# Patient Record
Sex: MALE | Race: WHITE | Employment: FULL TIME | URBAN - METROPOLITAN AREA
[De-identification: names, ages, dates, MRNs, and addresses within clinical notes are randomized per-mention and may not be internally consistent; named-entity substitution may affect disease eponyms.]

---

## 2021-05-25 ENCOUNTER — APPOINTMENT (OUTPATIENT)
Dept: CT IMAGING | Age: 56
End: 2021-05-25
Payer: COMMERCIAL

## 2021-05-25 ENCOUNTER — HOSPITAL ENCOUNTER (EMERGENCY)
Age: 56
Discharge: HOME | End: 2021-05-25
Attending: EMERGENCY MEDICINE
Payer: COMMERCIAL

## 2021-05-25 VITALS
WEIGHT: 235 LBS | HEIGHT: 74 IN | TEMPERATURE: 97.8 F | RESPIRATION RATE: 16 BRPM | HEART RATE: 52 BPM | SYSTOLIC BLOOD PRESSURE: 144 MMHG | OXYGEN SATURATION: 96 % | DIASTOLIC BLOOD PRESSURE: 92 MMHG | BODY MASS INDEX: 30.16 KG/M2

## 2021-05-25 DIAGNOSIS — N20.0 KIDNEY STONE: Primary | ICD-10-CM

## 2021-05-25 LAB
ANION GAP SERPL CALCULATED.3IONS-SCNC: 10 MMOL/L (ref 3–16)
BACTERIA: ABNORMAL /HPF
BILIRUBIN URINE: ABNORMAL
BLOOD, URINE: ABNORMAL
BUN BLDV-MCNC: 10 MG/DL (ref 7–20)
CALCIUM SERPL-MCNC: 9.6 MG/DL (ref 8.3–10.6)
CHLORIDE BLD-SCNC: 103 MMOL/L (ref 99–110)
CLARITY: ABNORMAL
CO2: 28 MMOL/L (ref 21–32)
COLOR: ABNORMAL
CREAT SERPL-MCNC: 1.2 MG/DL (ref 0.9–1.3)
EPITHELIAL CELLS, UA: ABNORMAL /HPF (ref 0–5)
GFR AFRICAN AMERICAN: >60
GFR NON-AFRICAN AMERICAN: >60
GLUCOSE BLD-MCNC: 136 MG/DL (ref 70–99)
GLUCOSE URINE: NEGATIVE MG/DL
KETONES, URINE: 15 MG/DL
LEUKOCYTE ESTERASE, URINE: NEGATIVE
MICROSCOPIC EXAMINATION: YES
MUCUS: ABNORMAL /LPF
NITRITE, URINE: POSITIVE
PH UA: 6.5 (ref 5–8)
POTASSIUM REFLEX MAGNESIUM: 4.1 MMOL/L (ref 3.5–5.1)
PROTEIN UA: 100 MG/DL
RBC UA: ABNORMAL /HPF (ref 0–4)
SODIUM BLD-SCNC: 141 MMOL/L (ref 136–145)
SPECIFIC GRAVITY UA: >=1.03 (ref 1–1.03)
URINE TYPE: ABNORMAL
UROBILINOGEN, URINE: 1 E.U./DL
WBC UA: ABNORMAL /HPF (ref 0–5)

## 2021-05-25 PROCEDURE — 96375 TX/PRO/DX INJ NEW DRUG ADDON: CPT

## 2021-05-25 PROCEDURE — 6360000002 HC RX W HCPCS: Performed by: EMERGENCY MEDICINE

## 2021-05-25 PROCEDURE — 80048 BASIC METABOLIC PNL TOTAL CA: CPT

## 2021-05-25 PROCEDURE — 2580000003 HC RX 258: Performed by: EMERGENCY MEDICINE

## 2021-05-25 PROCEDURE — 81001 URINALYSIS AUTO W/SCOPE: CPT

## 2021-05-25 PROCEDURE — 96374 THER/PROPH/DIAG INJ IV PUSH: CPT

## 2021-05-25 PROCEDURE — 99284 EMERGENCY DEPT VISIT MOD MDM: CPT

## 2021-05-25 PROCEDURE — 74176 CT ABD & PELVIS W/O CONTRAST: CPT

## 2021-05-25 PROCEDURE — 6370000000 HC RX 637 (ALT 250 FOR IP): Performed by: EMERGENCY MEDICINE

## 2021-05-25 RX ORDER — KETOROLAC TROMETHAMINE 30 MG/ML
15 INJECTION, SOLUTION INTRAMUSCULAR; INTRAVENOUS ONCE
Status: COMPLETED | OUTPATIENT
Start: 2021-05-25 | End: 2021-05-25

## 2021-05-25 RX ORDER — OXYCODONE HYDROCHLORIDE AND ACETAMINOPHEN 5; 325 MG/1; MG/1
1 TABLET ORAL ONCE
Status: COMPLETED | OUTPATIENT
Start: 2021-05-25 | End: 2021-05-25

## 2021-05-25 RX ORDER — ONDANSETRON 2 MG/ML
4 INJECTION INTRAMUSCULAR; INTRAVENOUS ONCE
Status: COMPLETED | OUTPATIENT
Start: 2021-05-25 | End: 2021-05-25

## 2021-05-25 RX ORDER — OXYCODONE HYDROCHLORIDE AND ACETAMINOPHEN 5; 325 MG/1; MG/1
1 TABLET ORAL EVERY 6 HOURS PRN
Qty: 12 TABLET | Refills: 0 | Status: SHIPPED | OUTPATIENT
Start: 2021-05-25 | End: 2021-05-28

## 2021-05-25 RX ORDER — CEPHALEXIN 250 MG/1
500 CAPSULE ORAL ONCE
Status: COMPLETED | OUTPATIENT
Start: 2021-05-25 | End: 2021-05-25

## 2021-05-25 RX ORDER — CEPHALEXIN 500 MG/1
500 CAPSULE ORAL 4 TIMES DAILY
Qty: 28 CAPSULE | Refills: 0 | Status: SHIPPED | OUTPATIENT
Start: 2021-05-25 | End: 2021-06-01

## 2021-05-25 RX ORDER — ONDANSETRON 4 MG/1
4 TABLET, ORALLY DISINTEGRATING ORAL 3 TIMES DAILY PRN
Qty: 21 TABLET | Refills: 0 | Status: SHIPPED | OUTPATIENT
Start: 2021-05-25

## 2021-05-25 RX ORDER — 0.9 % SODIUM CHLORIDE 0.9 %
1000 INTRAVENOUS SOLUTION INTRAVENOUS ONCE
Status: COMPLETED | OUTPATIENT
Start: 2021-05-25 | End: 2021-05-25

## 2021-05-25 RX ORDER — TAMSULOSIN HYDROCHLORIDE 0.4 MG/1
0.4 CAPSULE ORAL DAILY
Qty: 7 CAPSULE | Refills: 0 | Status: SHIPPED | OUTPATIENT
Start: 2021-05-25 | End: 2021-06-01

## 2021-05-25 RX ORDER — MORPHINE SULFATE 4 MG/ML
4 INJECTION, SOLUTION INTRAMUSCULAR; INTRAVENOUS ONCE
Status: COMPLETED | OUTPATIENT
Start: 2021-05-25 | End: 2021-05-25

## 2021-05-25 RX ADMIN — OXYCODONE HYDROCHLORIDE AND ACETAMINOPHEN 1 TABLET: 5; 325 TABLET ORAL at 12:10

## 2021-05-25 RX ADMIN — SODIUM CHLORIDE 1000 ML: 9 INJECTION, SOLUTION INTRAVENOUS at 07:00

## 2021-05-25 RX ADMIN — ONDANSETRON 4 MG: 2 INJECTION INTRAMUSCULAR; INTRAVENOUS at 07:00

## 2021-05-25 RX ADMIN — KETOROLAC TROMETHAMINE 15 MG: 30 INJECTION, SOLUTION INTRAMUSCULAR at 07:00

## 2021-05-25 RX ADMIN — OXYCODONE HYDROCHLORIDE AND ACETAMINOPHEN 1 TABLET: 5; 325 TABLET ORAL at 09:51

## 2021-05-25 RX ADMIN — CEPHALEXIN 500 MG: 250 CAPSULE ORAL at 12:10

## 2021-05-25 RX ADMIN — MORPHINE SULFATE 4 MG: 4 INJECTION, SOLUTION INTRAMUSCULAR; INTRAVENOUS at 08:46

## 2021-05-25 ASSESSMENT — PAIN SCALES - GENERAL: PAINLEVEL_OUTOF10: 5

## 2021-05-25 NOTE — ED NOTES
Verbal and written discharge instructions given. IV removed. Patient provided with strainer. Prescriptions given to patient. Patient in stable condition, discharged home with wife.       Lorena Diaz RN  05/25/21 1218

## 2021-05-25 NOTE — ED NOTES
a hospitalists added to treatment team at 10:57.   RE:ureterolithiasis     Amanda Ramirez  05/25/21 105

## 2021-05-25 NOTE — ED PROVIDER NOTES
[address] [facility]  ED  EMERGENCY DEPARTMENT ENCOUNTER      Pt Name: Shanta Lopez  MRN: [id number]  Gaudenciogfmichael 1965  Date of evaluation: 5/25/2021  Provider: Darryle Setters, MD    CHIEF COMPLAINT       Chief Complaint   Patient presents with    Flank Pain     right side flank pain started at 0400. denies any hx of kidney stones. states the pain has made him vomit.  Emesis         HISTORY OF PRESENT ILLNESS   (Location/Symptom, Timing/Onset, Context/Setting, Quality, Duration, Modifying Factors, Severity)  Note limiting factors. Shanta Lopez is a 54 y.o. male with past medical history of significant illness here today for flank pain. Patient states that approximately 4 AM he had the acute onset of sharp stabbing pain in the right flank coming going in waves. Moderate to severe. Associated with multiple episodes of emesis. No dysuria or hematuria. Pain is nonradiating. No obvious alleviating factors. No fevers or chills. No chest pain, cough or shortness of breath. He is concerned he may have a kidney stone though notes he is never had one in the past.    [facility]    Nursing Notes were reviewed. REVIEW OF SYSTEMS    (2-9 systems for level 4, 10 or more for level 5)     Review of Systems    Please see HPI for pertinent positive and negative review of system findings. A full 10 system ROS was performed and otherwise negative. PAST MEDICAL HISTORY     Past Medical History:   Diagnosis Date    COVID-19          SURGICAL HISTORY       Past Surgical History:   Procedure Laterality Date    EYE SURGERY Bilateral     Cataract, detached retina         CURRENT MEDICATIONS       Previous Medications    ASPIRIN 81 MG TABLET    Take 81 mg by mouth daily. CHOLECALCIFEROL (VITAMIN D3) 2000 UNITS CAPS    Take  by mouth daily. MULTIPLE VITAMINS-MINERALS (THERAPEUTIC MULTIVITAMIN-MINERALS) TABLET    Take 1 tablet by mouth daily    OMEGA-3 FATTY ACIDS (FISH OIL PO)    Take  by mouth daily. ALLERGIES     Patient has no known allergies. FAMILY HISTORY       Family History   Problem Relation Age of Onset    High Blood Pressure Mother     Other Father         hole in heart          SOCIAL HISTORY       Social History     Socioeconomic History    Marital status:      Spouse name: None    Number of children: None    Years of education: None    Highest education level: None   Occupational History    None   Tobacco Use    Smoking status: Never Smoker    Smokeless tobacco: Never Used   Substance and Sexual Activity    Alcohol use: Yes     Alcohol/week: 5.0 standard drinks     Types: 2 Glasses of wine, 3 Cans of beer per week    Drug use: No    Sexual activity: Yes     Partners: Female   Other Topics Concern    None   Social History Narrative    None     Social Determinants of Health     Financial Resource Strain:     Difficulty of Paying Living Expenses:    Food Insecurity:     Worried About Running Out of Food in the Last Year:     Ran Out of Food in the Last Year:    Transportation Needs:     Lack of Transportation (Medical):      Lack of Transportation (Non-Medical):    Physical Activity:     Days of Exercise per Week:     Minutes of Exercise per Session:    Stress:     Feeling of Stress :    Social Connections:     Frequency of Communication with Friends and Family:     Frequency of Social Gatherings with Friends and Family:     Attends [employer] Services:     Active Member of Clubs or Organizations:     Attends Club or Organization Meetings:     Marital Status:    Intimate Partner Violence:     Fear of Current or Ex-Partner:     Emotionally Abused:     Physically Abused:     Sexually Abused:        SCREENINGS    [place] Coma Scale  Eye Opening: Spontaneous  Best Verbal Response: Oriented  Best Motor Response: Obeys commands  Jennifer Coma Scale Score: 15          PHYSICAL EXAM    (up to 7 for level 4, 8 or more for level 5)     ED Triage Vitals [05/25/21 0620] BP Temp Temp Source Pulse Resp SpO2 Height Weight   (!) 148/91 97.8 °F (36.6 °C) Oral 51 17 98 % 6' 2\" (1.88 m) 235 lb (106.6 kg)       Physical Exam    General appearance:  Cooperative. No acute distress. Skin:  Warm. Dry. Eye:  Extraocular movements intact. Ears, nose, mouth and throat:  Oral mucosa moist,  Neck:  Trachea midline. Heart:  Regular rate and rhythm  Perfusion:  intact  Respiratory:  Lungs clear to auscultation bilaterally. Respirations nonlabored. Abdominal:   Non distended. Mild right CVA tenderness but overall soft abdomen  Neurological:  Alert and oriented x 3.   Moves all extremities spontaneously  Musculoskeletal:   Normal ROM, no deformities          Psychiatric:  Normal mood      DIAGNOSTIC RESULTS       Labs Reviewed   URINALYSIS - Abnormal; Notable for the following components:       Result Value    Clarity, UA SL CLOUDY (*)     Bilirubin Urine MODERATE (*)     Ketones, Urine 15 (*)     Blood, Urine LARGE (*)     Protein,  (*)     Nitrite, Urine POSITIVE (*)     All other components within normal limits    Narrative:     Performed at:  [address],  [place]   Phone [phone]   BASIC METABOLIC PANEL W/ REFLEX TO MG FOR LOW K - Abnormal; Notable for the following components:    Glucose 136 (*)     All other components within normal limits    Narrative:     Performed at:  [address],  [place], [place]   Phone [phone]   MICROSCOPIC URINALYSIS - Abnormal; Notable for the following components:    Mucus, UA 1+ (*)     WBC, UA 6-9 (*)     RBC, UA 21-50 (*)     Bacteria, UA Rare (*)     All other components within normal limits    Narrative:     Performed at:  [address],  [place], [place]   Phone [phone]       Interpretation per the Radiologist below, if obtained/available at the time of this note:    CT ABDOMEN PELVIS WO CONTRAST Additional Contrast? None   Final Result   1. Mildly obstructing 4 mm proximal right ureteral calculus. 2. Nonobstructing right nephrolithiasis. All other labs/imaging were within normal range or not returned as of this dictation. EMERGENCY DEPARTMENT COURSE and DIFFERENTIAL DIAGNOSIS/MDM:   Vitals:    Vitals:    05/25/21 0620 05/25/21 0727 05/25/21 1054   BP: (!) 148/91 122/83 (!) 145/87   Pulse: 51 55 58   Resp: 17 18 16   Temp: 97.8 °F (36.6 °C)     TempSrc: Oral     SpO2: 98% 97% 96%   Weight: 235 lb (106.6 kg)     Height: 6' 2\" (1.88 m)         Patient presents emergency department today acute onset flank pain. Exam and history highly concerning for likely ureterolithiasis. Renal function normal.  CT scan confirmed obstructive uropathy with a 4 mm stone. Patient's pain was treated aggressively with Toradol, morphine and Percocet here. Feeling improved on reexamination. Ultimately able to provide urine which did show positive nitrite which may be spurious reading in the setting of hematuria, but also had slightly elevated white blood cells and a small amount of bacteria. Question underlying infection. Discussed the case with on-call urology, Dr. Tracie Fajardo, who felt that as the patient's pain is under control he could be discharged home with antibiotics and close outpatient follow-up.   He will be given pain medication, nausea medication, Flomax and Keflex and was given strict return precautions but otherwise will follow up with urology by calling to schedule appointment later on this afternoon    MDM    CONSULTS     IP CONSULT TO HOSPITALIST    Critical Care:   None    REASSESSMENT          PROCEDURE     Unless otherwise noted below, none     Procedures      FINAL IMPRESSION      1. Kidney stone            DISPOSITION/PLAN   DISPOSITION Decision To Discharge 05/25/2021 11:56:26 AM        PATIENT REFERRED TO:  MD Beth Coto Dr Jeff Sy  [phone]    Schedule an appointment as soon as possible for a visit         DISCHARGE MEDICATIONS:  New Prescriptions    CEPHALEXIN (KEFLEX) 500 MG CAPSULE    Take 1 capsule by mouth 4 times daily for 7 days    ONDANSETRON (ZOFRAN-ODT) 4 MG DISINTEGRATING TABLET    Take 1 tablet by mouth 3 times daily as needed for Nausea or Vomiting    OXYCODONE-ACETAMINOPHEN (PERCOCET) 5-325 MG PER TABLET    Take 1 tablet by mouth every 6 hours as needed for Pain for up to 3 days. Intended supply: 3 days. Take lowest dose possible to manage pain    TAMSULOSIN (FLOMAX) 0.4 MG CAPSULE    Take 1 capsule by mouth daily for 7 days     Controlled Substances Monitoring:     No flowsheet data found.     (Please note that portions of this note were completed with a voice recognition program.  Efforts were made to edit the dictations but occasionally words are mis-transcribed.)    Henrique Page MD (electronically signed)  Attending Emergency Physician            Brad Bateman MD  05/25/21 2071

## 2021-05-26 ENCOUNTER — HOSPITAL ENCOUNTER (OUTPATIENT)
Age: 56
Discharge: HOME | End: 2021-05-26
Payer: COMMERCIAL

## 2021-05-26 LAB — SARS-COV-2: NOT DETECTED

## 2021-05-26 PROCEDURE — U0003 INFECTIOUS AGENT DETECTION BY NUCLEIC ACID (DNA OR RNA); SEVERE ACUTE RESPIRATORY SYNDROME CORONAVIRUS 2 (SARS-COV-2) (CORONAVIRUS DISEASE [COVID-19]), AMPLIFIED PROBE TECHNIQUE, MAKING USE OF HIGH THROUGHPUT TECHNOLOGIES AS DESCRIBED BY CMS-2020-01-R: HCPCS

## 2021-05-26 PROCEDURE — U0005 INFEC AGEN DETEC AMPLI PROBE: HCPCS

## 2021-05-27 ENCOUNTER — ANESTHESIA (OUTPATIENT)
Dept: OPERATING ROOM | Age: 56
End: 2021-05-27
Payer: COMMERCIAL

## 2021-05-27 ENCOUNTER — APPOINTMENT (OUTPATIENT)
Dept: GENERAL RADIOLOGY | Age: 56
End: 2021-05-27
Attending: UROLOGY
Payer: COMMERCIAL

## 2021-05-27 ENCOUNTER — HOSPITAL ENCOUNTER (OUTPATIENT)
Age: 56
Setting detail: OUTPATIENT SURGERY
Discharge: HOME | End: 2021-05-27
Attending: UROLOGY | Admitting: UROLOGY
Payer: COMMERCIAL

## 2021-05-27 ENCOUNTER — ANESTHESIA EVENT (OUTPATIENT)
Dept: OPERATING ROOM | Age: 56
End: 2021-05-27
Payer: COMMERCIAL

## 2021-05-27 VITALS
WEIGHT: 235 LBS | HEIGHT: 75 IN | BODY MASS INDEX: 29.22 KG/M2 | TEMPERATURE: 97.4 F | HEART RATE: 92 BPM | RESPIRATION RATE: 21 BRPM | SYSTOLIC BLOOD PRESSURE: 129 MMHG | OXYGEN SATURATION: 91 % | DIASTOLIC BLOOD PRESSURE: 93 MMHG

## 2021-05-27 VITALS — TEMPERATURE: 98.2 F | DIASTOLIC BLOOD PRESSURE: 54 MMHG | OXYGEN SATURATION: 96 % | SYSTOLIC BLOOD PRESSURE: 83 MMHG

## 2021-05-27 PROCEDURE — 3600000004 HC SURGERY LEVEL 4 BASE: Performed by: UROLOGY

## 2021-05-27 PROCEDURE — 87086 URINE CULTURE/COLONY COUNT: CPT

## 2021-05-27 PROCEDURE — 3700000000 HC ANESTHESIA ATTENDED CARE: Performed by: UROLOGY

## 2021-05-27 PROCEDURE — 2580000003 HC RX 258: Performed by: UROLOGY

## 2021-05-27 PROCEDURE — 6360000004 HC RX CONTRAST MEDICATION: Performed by: UROLOGY

## 2021-05-27 PROCEDURE — 7100000011 HC PHASE II RECOVERY - ADDTL 15 MIN: Performed by: UROLOGY

## 2021-05-27 PROCEDURE — 7100000000 HC PACU RECOVERY - FIRST 15 MIN: Performed by: UROLOGY

## 2021-05-27 PROCEDURE — 2580000003 HC RX 258: Performed by: ANESTHESIOLOGY

## 2021-05-27 PROCEDURE — 3600000014 HC SURGERY LEVEL 4 ADDTL 15MIN: Performed by: UROLOGY

## 2021-05-27 PROCEDURE — 6360000002 HC RX W HCPCS: Performed by: UROLOGY

## 2021-05-27 PROCEDURE — 74420 UROGRAPHY RTRGR +-KUB: CPT

## 2021-05-27 PROCEDURE — C2617 STENT, NON-COR, TEM W/O DEL: HCPCS | Performed by: UROLOGY

## 2021-05-27 PROCEDURE — 6360000002 HC RX W HCPCS: Performed by: NURSE ANESTHETIST, CERTIFIED REGISTERED

## 2021-05-27 PROCEDURE — 2500000003 HC RX 250 WO HCPCS: Performed by: NURSE ANESTHETIST, CERTIFIED REGISTERED

## 2021-05-27 PROCEDURE — 2709999900 HC NON-CHARGEABLE SUPPLY: Performed by: UROLOGY

## 2021-05-27 PROCEDURE — 3700000001 HC ADD 15 MINUTES (ANESTHESIA): Performed by: UROLOGY

## 2021-05-27 PROCEDURE — 7100000010 HC PHASE II RECOVERY - FIRST 15 MIN: Performed by: UROLOGY

## 2021-05-27 PROCEDURE — 2580000003 HC RX 258: Performed by: NURSE ANESTHETIST, CERTIFIED REGISTERED

## 2021-05-27 PROCEDURE — 7100000001 HC PACU RECOVERY - ADDTL 15 MIN: Performed by: UROLOGY

## 2021-05-27 PROCEDURE — C1769 GUIDE WIRE: HCPCS | Performed by: UROLOGY

## 2021-05-27 DEVICE — URETERAL STENT
Type: IMPLANTABLE DEVICE | Site: URETER | Status: FUNCTIONAL
Brand: CONTOUR™

## 2021-05-27 RX ORDER — MEPERIDINE HYDROCHLORIDE 25 MG/ML
12.5 INJECTION INTRAMUSCULAR; INTRAVENOUS; SUBCUTANEOUS EVERY 5 MIN PRN
Status: DISCONTINUED | OUTPATIENT
Start: 2021-05-27 | End: 2021-05-27 | Stop reason: HOSPADM

## 2021-05-27 RX ORDER — ONDANSETRON 2 MG/ML
INJECTION INTRAMUSCULAR; INTRAVENOUS PRN
Status: DISCONTINUED | OUTPATIENT
Start: 2021-05-27 | End: 2021-05-27 | Stop reason: SDUPTHER

## 2021-05-27 RX ORDER — PROPOFOL 10 MG/ML
INJECTION, EMULSION INTRAVENOUS PRN
Status: DISCONTINUED | OUTPATIENT
Start: 2021-05-27 | End: 2021-05-27 | Stop reason: SDUPTHER

## 2021-05-27 RX ORDER — OXYCODONE HYDROCHLORIDE AND ACETAMINOPHEN 5; 325 MG/1; MG/1
1 TABLET ORAL PRN
Status: DISCONTINUED | OUTPATIENT
Start: 2021-05-27 | End: 2021-05-27 | Stop reason: HOSPADM

## 2021-05-27 RX ORDER — MAGNESIUM HYDROXIDE 1200 MG/15ML
LIQUID ORAL PRN
Status: DISCONTINUED | OUTPATIENT
Start: 2021-05-27 | End: 2021-05-27 | Stop reason: ALTCHOICE

## 2021-05-27 RX ORDER — SODIUM CHLORIDE, SODIUM LACTATE, POTASSIUM CHLORIDE, CALCIUM CHLORIDE 600; 310; 30; 20 MG/100ML; MG/100ML; MG/100ML; MG/100ML
INJECTION, SOLUTION INTRAVENOUS CONTINUOUS PRN
Status: DISCONTINUED | OUTPATIENT
Start: 2021-05-27 | End: 2021-05-27 | Stop reason: SDUPTHER

## 2021-05-27 RX ORDER — HYDRALAZINE HYDROCHLORIDE 20 MG/ML
5 INJECTION INTRAMUSCULAR; INTRAVENOUS EVERY 10 MIN PRN
Status: DISCONTINUED | OUTPATIENT
Start: 2021-05-27 | End: 2021-05-27 | Stop reason: HOSPADM

## 2021-05-27 RX ORDER — SUCCINYLCHOLINE CHLORIDE 20 MG/ML
INJECTION INTRAMUSCULAR; INTRAVENOUS PRN
Status: DISCONTINUED | OUTPATIENT
Start: 2021-05-27 | End: 2021-05-27 | Stop reason: SDUPTHER

## 2021-05-27 RX ORDER — SODIUM CHLORIDE 0.9 % (FLUSH) 0.9 %
5-40 SYRINGE (ML) INJECTION PRN
Status: DISCONTINUED | OUTPATIENT
Start: 2021-05-27 | End: 2021-05-27 | Stop reason: HOSPADM

## 2021-05-27 RX ORDER — LABETALOL HYDROCHLORIDE 5 MG/ML
5 INJECTION, SOLUTION INTRAVENOUS EVERY 10 MIN PRN
Status: DISCONTINUED | OUTPATIENT
Start: 2021-05-27 | End: 2021-05-27 | Stop reason: HOSPADM

## 2021-05-27 RX ORDER — SODIUM CHLORIDE 0.9 % (FLUSH) 0.9 %
5-40 SYRINGE (ML) INJECTION EVERY 12 HOURS SCHEDULED
Status: DISCONTINUED | OUTPATIENT
Start: 2021-05-27 | End: 2021-05-27 | Stop reason: HOSPADM

## 2021-05-27 RX ORDER — SODIUM CHLORIDE, SODIUM LACTATE, POTASSIUM CHLORIDE, CALCIUM CHLORIDE 600; 310; 30; 20 MG/100ML; MG/100ML; MG/100ML; MG/100ML
INJECTION, SOLUTION INTRAVENOUS CONTINUOUS
Status: DISCONTINUED | OUTPATIENT
Start: 2021-05-27 | End: 2021-05-27 | Stop reason: HOSPADM

## 2021-05-27 RX ORDER — DIPHENHYDRAMINE HYDROCHLORIDE 50 MG/ML
12.5 INJECTION INTRAMUSCULAR; INTRAVENOUS
Status: DISCONTINUED | OUTPATIENT
Start: 2021-05-27 | End: 2021-05-27 | Stop reason: HOSPADM

## 2021-05-27 RX ORDER — FENTANYL CITRATE 50 UG/ML
INJECTION, SOLUTION INTRAMUSCULAR; INTRAVENOUS PRN
Status: DISCONTINUED | OUTPATIENT
Start: 2021-05-27 | End: 2021-05-27 | Stop reason: SDUPTHER

## 2021-05-27 RX ORDER — MORPHINE SULFATE 10 MG/ML
1 INJECTION, SOLUTION INTRAMUSCULAR; INTRAVENOUS EVERY 5 MIN PRN
Status: DISCONTINUED | OUTPATIENT
Start: 2021-05-27 | End: 2021-05-27 | Stop reason: HOSPADM

## 2021-05-27 RX ORDER — PROMETHAZINE HYDROCHLORIDE 25 MG/ML
6.25 INJECTION, SOLUTION INTRAMUSCULAR; INTRAVENOUS
Status: DISCONTINUED | OUTPATIENT
Start: 2021-05-27 | End: 2021-05-27 | Stop reason: HOSPADM

## 2021-05-27 RX ORDER — LIDOCAINE HYDROCHLORIDE 10 MG/ML
0.3 INJECTION, SOLUTION EPIDURAL; INFILTRATION; INTRACAUDAL; PERINEURAL
Status: DISCONTINUED | OUTPATIENT
Start: 2021-05-27 | End: 2021-05-27 | Stop reason: HOSPADM

## 2021-05-27 RX ORDER — SODIUM CHLORIDE 9 MG/ML
25 INJECTION, SOLUTION INTRAVENOUS PRN
Status: DISCONTINUED | OUTPATIENT
Start: 2021-05-27 | End: 2021-05-27 | Stop reason: HOSPADM

## 2021-05-27 RX ORDER — MORPHINE SULFATE 10 MG/ML
2 INJECTION, SOLUTION INTRAMUSCULAR; INTRAVENOUS EVERY 5 MIN PRN
Status: DISCONTINUED | OUTPATIENT
Start: 2021-05-27 | End: 2021-05-27 | Stop reason: HOSPADM

## 2021-05-27 RX ORDER — DEXAMETHASONE SODIUM PHOSPHATE 4 MG/ML
INJECTION, SOLUTION INTRA-ARTICULAR; INTRALESIONAL; INTRAMUSCULAR; INTRAVENOUS; SOFT TISSUE PRN
Status: DISCONTINUED | OUTPATIENT
Start: 2021-05-27 | End: 2021-05-27 | Stop reason: SDUPTHER

## 2021-05-27 RX ORDER — LIDOCAINE HYDROCHLORIDE 20 MG/ML
INJECTION, SOLUTION INFILTRATION; PERINEURAL PRN
Status: DISCONTINUED | OUTPATIENT
Start: 2021-05-27 | End: 2021-05-27 | Stop reason: SDUPTHER

## 2021-05-27 RX ORDER — ONDANSETRON 2 MG/ML
4 INJECTION INTRAMUSCULAR; INTRAVENOUS PRN
Status: DISCONTINUED | OUTPATIENT
Start: 2021-05-27 | End: 2021-05-27 | Stop reason: HOSPADM

## 2021-05-27 RX ORDER — OXYCODONE HYDROCHLORIDE AND ACETAMINOPHEN 5; 325 MG/1; MG/1
2 TABLET ORAL PRN
Status: DISCONTINUED | OUTPATIENT
Start: 2021-05-27 | End: 2021-05-27 | Stop reason: HOSPADM

## 2021-05-27 RX ORDER — ROCURONIUM BROMIDE 10 MG/ML
INJECTION, SOLUTION INTRAVENOUS PRN
Status: DISCONTINUED | OUTPATIENT
Start: 2021-05-27 | End: 2021-05-27 | Stop reason: SDUPTHER

## 2021-05-27 RX ADMIN — SUCCINYLCHOLINE CHLORIDE 180 MG: 20 INJECTION, SOLUTION INTRAMUSCULAR; INTRAVENOUS at 13:53

## 2021-05-27 RX ADMIN — FENTANYL CITRATE 50 MCG: 50 INJECTION INTRAMUSCULAR; INTRAVENOUS at 13:58

## 2021-05-27 RX ADMIN — Medication 2000 MG: at 13:30

## 2021-05-27 RX ADMIN — ROCURONIUM BROMIDE 5 MG: 10 INJECTION, SOLUTION INTRAVENOUS at 13:53

## 2021-05-27 RX ADMIN — DEXAMETHASONE SODIUM PHOSPHATE 8 MG: 4 INJECTION, SOLUTION INTRAMUSCULAR; INTRAVENOUS at 14:03

## 2021-05-27 RX ADMIN — SODIUM CHLORIDE, POTASSIUM CHLORIDE, SODIUM LACTATE AND CALCIUM CHLORIDE: 600; 310; 30; 20 INJECTION, SOLUTION INTRAVENOUS at 14:41

## 2021-05-27 RX ADMIN — LIDOCAINE HYDROCHLORIDE 50 MG: 20 INJECTION, SOLUTION INFILTRATION; PERINEURAL at 13:53

## 2021-05-27 RX ADMIN — SODIUM CHLORIDE, POTASSIUM CHLORIDE, SODIUM LACTATE AND CALCIUM CHLORIDE: 600; 310; 30; 20 INJECTION, SOLUTION INTRAVENOUS at 12:39

## 2021-05-27 RX ADMIN — FENTANYL CITRATE 50 MCG: 50 INJECTION INTRAMUSCULAR; INTRAVENOUS at 13:53

## 2021-05-27 RX ADMIN — PROPOFOL 280 MG: 10 INJECTION, EMULSION INTRAVENOUS at 13:53

## 2021-05-27 RX ADMIN — SODIUM CHLORIDE, POTASSIUM CHLORIDE, SODIUM LACTATE AND CALCIUM CHLORIDE: 600; 310; 30; 20 INJECTION, SOLUTION INTRAVENOUS at 13:48

## 2021-05-27 RX ADMIN — ONDANSETRON 4 MG: 2 INJECTION, SOLUTION INTRAMUSCULAR; INTRAVENOUS at 14:03

## 2021-05-27 ASSESSMENT — PULMONARY FUNCTION TESTS
PIF_VALUE: 5
PIF_VALUE: 16
PIF_VALUE: 16
PIF_VALUE: 17
PIF_VALUE: 1
PIF_VALUE: 17
PIF_VALUE: 17
PIF_VALUE: 3
PIF_VALUE: 24
PIF_VALUE: 1
PIF_VALUE: 17
PIF_VALUE: 17
PIF_VALUE: 16
PIF_VALUE: 17
PIF_VALUE: 17
PIF_VALUE: 4
PIF_VALUE: 18
PIF_VALUE: 17
PIF_VALUE: 1
PIF_VALUE: 18

## 2021-05-27 ASSESSMENT — PAIN SCALES - GENERAL
PAINLEVEL_OUTOF10: 0
PAINLEVEL_OUTOF10: 0

## 2021-05-27 ASSESSMENT — PAIN - FUNCTIONAL ASSESSMENT: PAIN_FUNCTIONAL_ASSESSMENT: 0-10

## 2021-05-27 NOTE — PROGRESS NOTES
Assessment unchanged from previous. Verbalizes understanding of discharge instructions and written instructions also given to patient. Questions and concerns addressed at this time. Denies  Pain or any needs at this time. IV d/c'd. Pt discharged via wheelchair to car in good condition. All personal belongings returned to pt.

## 2021-05-27 NOTE — ANESTHESIA PRE PROCEDURE
Department of Anesthesiology  Preprocedure Note       Name:  Shanta Lopez   Age:  54 y.o.  :  1965                                          MRN:  [id number]         Date:  2021      Surgeon: Ok Floor):  Ayesha Trevino MD    Procedure: Procedure(s):  CYSTOSCOPY, RIGHT URETERAL STENT INSERTION    Medications prior to admission:   Prior to Admission medications    Medication Sig Start Date End Date Taking? Authorizing Provider   oxyCODONE-acetaminophen (PERCOCET) 5-325 MG per tablet Take 1 tablet by mouth every 6 hours as needed for Pain for up to 3 days. Intended supply: 3 days. Take lowest dose possible to manage pain 21 Yes Moiz Sue MD   tamsulosin (FLOMAX) 0.4 MG capsule Take 1 capsule by mouth daily for 7 days 21 Yes Moiz Sue MD   cephALEXin (KEFLEX) 500 MG capsule Take 1 capsule by mouth 4 times daily for 7 days 21 Yes Moiz Sue MD   ondansetron (ZOFRAN-ODT) 4 MG disintegrating tablet Take 1 tablet by mouth 3 times daily as needed for Nausea or Vomiting 21   Moiz Sue MD   Multiple Vitamins-Minerals (THERAPEUTIC MULTIVITAMIN-MINERALS) tablet Take 1 tablet by mouth daily    Historical Provider, MD   Omega-3 Fatty Acids (FISH OIL PO) Take  by mouth daily. Historical Provider, MD   Cholecalciferol (VITAMIN D3) 2000 UNITS CAPS Take  by mouth daily.     Historical Provider, MD       Current medications:    Current Facility-Administered Medications   Medication Dose Route Frequency Provider Last Rate Last Admin    ceFAZolin (ANCEF) 2000 mg in sterile water 20 mL IV syringe  2,000 mg Intravenous Once Ayesha Trevino MD        lactated ringers infusion   Intravenous Continuous Danelle Wise  mL/hr at 21 1239 New Bag at 21 1239    sodium chloride flush 0.9 % injection 5-40 mL  5-40 mL Intravenous 2 times per day Danelle Wise MD        sodium chloride flush 0.9 % injection 5-40 mL  5-40 mL Intravenous PRN Angela Diamond MD        0.9 % sodium chloride infusion  25 mL Intravenous PRN Angela Diamond MD        lidocaine PF 1 % injection 0.3 mL  0.3 mL Intradermal Once PRN Angela Diamond MD           Allergies:  No Known Allergies    Problem List:  There is no problem list on file for this patient. Past Medical History:        Diagnosis Date    COVID-19        Past Surgical History:        Procedure Laterality Date    EYE SURGERY Bilateral     Cataract, detached retina       Social History:    Social History     Tobacco Use    Smoking status: Never Smoker    Smokeless tobacco: Never Used   Substance Use Topics    Alcohol use: Yes     Alcohol/week: 5.0 standard drinks     Types: 2 Glasses of wine, 3 Cans of beer per week                                Counseling given: Not Answered      Vital Signs (Current):   Vitals:    05/27/21 1220   BP: (!) 142/82   Pulse: 73   Resp: 16   Temp: 98 °F (36.7 °C)   TempSrc: Infrared   SpO2: 95%   Weight: 235 lb (106.6 kg)   Height: 6' 3\" (1.905 m)                                              BP Readings from Last 3 Encounters:   05/27/21 (!) 142/82   05/25/21 (!) 144/92   06/29/15 123/86       NPO Status: Time of last liquid consumption: 0900 (sip of water with meds)                        Time of last solid consumption: 0000                        Date of last liquid consumption: 05/27/21                        Date of last solid food consumption: 05/26/21    BMI:   Wt Readings from Last 3 Encounters:   05/27/21 235 lb (106.6 kg)   05/25/21 235 lb (106.6 kg)   06/29/15 235 lb (106.6 kg)     Body mass index is 29.37 kg/m².     CBC:   Lab Results   Component Value Date    WBC 4.4 06/29/2015    RBC 5.56 06/29/2015    HGB 16.2 06/29/2015    HCT 51.3 06/29/2015    MCV 92.3 06/29/2015    RDW 13.1 06/29/2015     06/29/2015       CMP:   Lab Results   Component Value Date     05/25/2021    K 4.1 05/25/2021     05/25/2021    CO2 28 05/25/2021    BUN 10 05/25/2021 CREATININE 1.2 05/25/2021    GFRAA >60 05/25/2021    AGRATIO 2.4 03/04/2014    LABGLOM >60 05/25/2021    GLUCOSE 136 05/25/2021    PROT 6.8 06/29/2015    CALCIUM 9.6 05/25/2021    BILITOT 1.2 06/29/2015    ALKPHOS 74 06/29/2015    AST 22 06/29/2015    ALT 22 06/29/2015       POC Tests: No results for input(s): POCGLU, POCNA, POCK, POCCL, POCBUN, POCHEMO, POCHCT in the last 72 hours. Coags: No results found for: PROTIME, INR, APTT    HCG (If Applicable): No results found for: PREGTESTUR, PREGSERUM, HCG, HCGQUANT     ABGs: No results found for: PHART, PO2ART, MMT4JEA, ADJ4RHF, BEART, P1SYOREM     Type & Screen (If Applicable):  No results found for: LABABO, LABRH    Drug/Infectious Status (If Applicable):  No results found for: HIV, HEPCAB    COVID-19 Screening (If Applicable):   Lab Results   Component Value Date    COVID19 Not Detected 05/26/2021           Anesthesia Evaluation  Patient summary reviewed and Nursing notes reviewed no history of anesthetic complications:   Airway: Mallampati: III     Neck ROM: full   Dental:          Pulmonary:Negative Pulmonary ROS and normal exam                               Cardiovascular:Negative CV ROS                      Neuro/Psych:   Negative Neuro/Psych ROS              GI/Hepatic/Renal: Neg GI/Hepatic/Renal ROS  (+) renal disease: kidney stones,      (-) hiatal hernia and GERD       Endo/Other: Negative Endo/Other ROS                    Abdominal:           Vascular:                                      Anesthesia Plan      general     ASA 2     (I discussed with the patient the risks and benefits of PIV, general anesthesia, IV Narcotics, PACU. All questions were answered the patient agrees with the plan and wishes to proceed.  )  Induction: intravenous. Pre-Operative Diagnosis: CALCULUS RIGHT URETER    54 y.o.   BMI:  Body mass index is 29.37 kg/m².      Vitals:    05/27/21 1220   BP: (!) 142/82   Pulse: 73   Resp: 16   Temp: 98 °F (36.7 °C) TempSrc: Infrared   SpO2: 95%   Weight: 235 lb (106.6 kg)   Height: 6' 3\" (1.905 m)       No Known Allergies    Social History     Tobacco Use    Smoking status: Never Smoker    Smokeless tobacco: Never Used   Substance Use Topics    Alcohol use:  Yes     Alcohol/week: 5.0 standard drinks     Types: 2 Glasses of wine, 3 Cans of beer per week       LABS:    CBC  Lab Results   Component Value Date/Time    WBC 4.4 06/29/2015 10:40 AM    HGB 16.2 06/29/2015 10:40 AM    HCT 51.3 06/29/2015 10:40 AM     06/29/2015 10:40 AM     RENAL  Lab Results   Component Value Date/Time     05/25/2021 06:48 AM    K 4.1 05/25/2021 06:48 AM     05/25/2021 06:48 AM    CO2 28 05/25/2021 06:48 AM    BUN 10 05/25/2021 06:48 AM    CREATININE 1.2 05/25/2021 06:48 AM    GLUCOSE 136 (H) 05/25/2021 06:48 AM     COAGS  No results found for: PROTIME, INR, APTT      Bonnie Woodruff MD   5/27/2021

## 2021-05-27 NOTE — OP NOTE
defect in the renal pelvis c/w the stone being bumped back. I then placed F [id number] MG right stent on fluoroscopic vision. This did not appear long enough so I replaced it for a 6X30 JJ stent. Good curls were noted in the bladder and kidney. The bladder was then emptied with the cystoscope sheath and obturator. The patient was then awakened from anesthesia, extubated, and brought to the PACU in stable condition.  There were no apparent complications.      PLAN  RIGHT ESWL next week     Electronically signed by Nicho Arce MD on 5/27/2021 at 2:30 PM

## 2021-05-27 NOTE — H&P
Urology Attending H&P Note      History: This is a 54 y.o. male who presents today for operative intervention for right ureteral stone     Family History, Social History, Review of Systems:  Reviewed and agreed to as per chart    Vitals:  BP (!) 142/82   Pulse 73   Temp 98 °F (36.7 °C) (Infrared)   Resp 16   Ht 6' 3\" (1.905 m)   Wt 235 lb (106.6 kg)   SpO2 95%   BMI 29.37 kg/m²   Temp  Av °F (36.7 °C)  Min: 98 °F (36.7 °C)  Max: 98 °F (36.7 °C)  No intake or output data in the 24 hours ending 21 1345      Physical:   Well developed, well nourished in no acute distress   Mood indicates no abnormalities. Pt doesnt appear depressed   Orientated to time and place   Neck is supple, trachea is midline   Respiratory effort is normal   Cardiovascular show no extremity swelling   Abdomen no masses or hernias are palpated, there is no tenderness. Liver and Spleen appear normal.   Skin show no abnormal lesions   Lymph nodes are not palpated in the inguinal, neck, or axillary area.     Labs:  WBC:    Lab Results   Component Value Date    WBC 4.4 2015     Hemoglobin/Hematocrit:    Lab Results   Component Value Date    HGB 16.2 2015    HCT 51.3 2015     BMP:    Lab Results   Component Value Date     2021    K 4.1 2021     2021    CO2 28 2021    BUN 10 2021    LABALBU 4.5 2015    CREATININE 1.2 2021    CALCIUM 9.6 2021    GFRAA >60 2021    LABGLOM >60 2021     PT/INR:  No results found for: PROTIME, INR  PTT:  No results found for: APTT[APTT      Impression/Plan:     -- to the OR   -- antibiotics on call  -- discussed with patient - all questions answered    Thank you for the opportunity to be involved in the care of this patient - please call with questions    Jennifer Mann MD  [facility]  Office - [phone]

## 2021-05-27 NOTE — ANESTHESIA POSTPROCEDURE EVALUATION
Department of Anesthesiology  Postprocedure Note    Patient: Claudia Branch  MRN: [id number]  YOB: 1965  Date of evaluation: 5/27/2021  Time:  4:00 PM     Procedure Summary     Date: 05/27/21 Room / Location: [place] [address] / [facility]    Anesthesia Start: 4956 Anesthesia Stop: 7942    Procedure: CYSTOSCOPY, RIGHT RETROGRADE,RIGHT URETERAL STENT INSERTION (Right Bladder) Diagnosis: (CALCULUS RIGHT URETER)    Surgeons: Mo Hawthorne MD Responsible Provider: Jes Brandt MD    Anesthesia Type: general ASA Status: 2          Anesthesia Type: general    Jennifer Phase I: Jennifer Score: 10    Jennifer Phase II: Jennifer Score: 10    Last vitals: Reviewed and per EMR flowsheets.        Anesthesia Post Evaluation    Comments: Postoperative Anesthesia Note    Name:    Claudia Branch  MRN:      [id number]    Patient Vitals in the past 12 hrs:  05/27/21 1517, BP:(!) 129/93, Pulse:92, Resp:21, SpO2:91 %  05/27/21 1515, BP:(!) 139/95, Pulse:94, Resp:14, SpO2:91 %  05/27/21 1500, BP:(!) 140/89, Temp:97.4 °F (36.3 °C), Temp src:Infrared, Pulse:94, Resp:12, SpO2:93 %  05/27/21 1450, BP:139/89, Pulse:97, Resp:19, SpO2:93 %  05/27/21 1445, BP:(!) 149/99, Pulse:102, Resp:22, SpO2:90 %  05/27/21 1441, BP:139/89, Temp:97.6 °F (36.4 °C), Temp src:Infrared, Pulse:98, Resp:16, SpO2:92 %  05/27/21 1220, BP:(!) 142/82, Temp:98 °F (36.7 °C), Temp src:Infrared, Pulse:73, Resp:16, SpO2:95 %, Height:6' 3\" (1.905 m), Weight:235 lb (106.6 kg)     LABS:    CBC  Lab Results       Component                Value               Date/Time                  WBC                      4.4                 06/29/2015 10:40 AM        HGB                      16.2                06/29/2015 10:40 AM        HCT                      51.3                06/29/2015 10:40 AM        PLT                      206                 06/29/2015 10:40 AM   RENAL  Lab Results       Component                Value               Date/Time

## 2021-05-29 LAB — URINE CULTURE, ROUTINE: NORMAL

## (undated) DEVICE — PREP SOL PVP IODINE 4%  4 OZ/BTL

## (undated) DEVICE — BAG URIN STRL FOR URO CTCH SYS

## (undated) DEVICE — MEDI-VAC NON-CONDUCTIVE SUCTION TUBING: Brand: CARDINAL HEALTH

## (undated) DEVICE — CYSTO/BLADDER IRRIGATION SET, REGULATING CLAMP

## (undated) DEVICE — DBD-PACK,CYSTOSCOPY,PK VI,AURORA: Brand: MEDLINE

## (undated) DEVICE — SYRINGE MED 10ML LUERLOCK TIP W/O SFTY DISP

## (undated) DEVICE — Z DUP USE 2522782 SOLUTION IRRIG 1000ML STRL H2O PLAS CONTAINER UROMATIC

## (undated) DEVICE — GOWN SIRUS NONREIN LG W/TWL: Brand: MEDLINE INDUSTRIES, INC.

## (undated) DEVICE — BOWL MED L 32OZ PLAS W/ MOLD GRAD EZ OPN PEEL PCH

## (undated) DEVICE — GLOVE ORANGE PI 7 1/2   MSG9075

## (undated) DEVICE — SOLUTION IV IRRIG 500ML 0.9% SODIUM CHL 2F7123

## (undated) DEVICE — Z CONVERTED USE 2271043 CONTAINER SPEC COLL 4OZ SCR ON LID PEEL PCH

## (undated) DEVICE — GUIDEWIRE ENDOSCP L150CM DIA0.035IN TIP 3CM PTFE NIT

## (undated) DEVICE — GAUZE,SPONGE,4"X4",8PLY,STRL,LF,10/TRAY: Brand: MEDLINE

## (undated) DEVICE — CIRCUIT ANES L72IN 3L BACT AND VIR FLTR EL CONN SGL LIMB

## (undated) DEVICE — INVIEW CLEAR LEGGINGS: Brand: CONVERTORS